# Patient Record
Sex: FEMALE | Race: WHITE | Employment: UNEMPLOYED | URBAN - METROPOLITAN AREA
[De-identification: names, ages, dates, MRNs, and addresses within clinical notes are randomized per-mention and may not be internally consistent; named-entity substitution may affect disease eponyms.]

---

## 2023-08-03 ENCOUNTER — HOSPITAL ENCOUNTER (OUTPATIENT)
Dept: PHYSICAL THERAPY | Age: 23
Setting detail: THERAPIES SERIES
Discharge: HOME | End: 2023-08-03
Payer: COMMERCIAL

## 2023-08-03 PROCEDURE — 97162 PT EVAL MOD COMPLEX 30 MIN: CPT

## 2023-08-03 PROCEDURE — 97530 THERAPEUTIC ACTIVITIES: CPT

## 2023-08-03 NOTE — PROGRESS NOTES
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  Treatment Diagnosis:underactive pelvic floor, pelvic floor weakness                          Insurance/Certification information:  PT Insurance Information: [employer]  Physician Information:  Kristel Leach DO  Plan of care signed (Y/N):  N    Visit# / total visits:         Functional Outcome (if applicable):          TBD NV    Medicare Cap (if applicable):  N/a= total amount used, updated 8/3/2023    Time in:   11:15am      Timed Treatment: 45min Total Treatment Time:  60min  ________________________________________________________________________________________    Pain Level:    /10  SUBJECTIVE:  see eval    OBJECTIVE:     Exercise (TE) Resistance/Repetitions Other comments            PF strengthening                                  PF relaxation Belly breathing:x10         Hip ADD stretch:x10         Happy Baby Pose:x10           Cat to Cow: x10              Other Treatment:   TA:  Bladder re-training/education:     Bladder Diary: patient educated on importance of filling out bladder diary at home, complete with fluid intake, voids, and leakage when applicable. Voiding: patient educated on normal voiding and urinary cycle and the physiology of bladder control muscles and pelvic floor. The patient was educated on bladder dysfunction as well as MERNA with urethral involvement.   The patient was also educated on decreased function of pelvic floor and it's affect on urination

## 2023-08-10 ENCOUNTER — HOSPITAL ENCOUNTER (OUTPATIENT)
Dept: PHYSICAL THERAPY | Age: 23
Setting detail: THERAPIES SERIES
Discharge: HOME | End: 2023-08-10
Payer: COMMERCIAL

## 2023-08-10 PROCEDURE — 97530 THERAPEUTIC ACTIVITIES: CPT

## 2023-08-10 PROCEDURE — 97110 THERAPEUTIC EXERCISES: CPT

## 2023-08-10 NOTE — PROGRESS NOTES
Disability index score of TBD or less for the PFDI-20 to assist with reaching prior level of function. [] Progressing: [] Met: [] Not Met: [] Adjusted  2. Patient will report ability to tolerate penetration without increase in pain to progress towards intercourse / examinations without pain or limitation. [] Progressing: [] Met: [] Not Met: [] Adjusted  3. Patient will increase PERF score to 4/5 with 10 second endurance without delay to relaxation to demonstrate increased strength and control over pelvic floor musculature. [] Progressing: [] Met: [] Not Met: [] Adjusted  4. Patient will report 1 week or greater without urinary incontinence to progress towards completing ADLs and recreational activities without leakage.   [] Progressing: [] Met: [] Not Met: [] Adjusted          Plan: [x] Continue per plan of care [] Alter current plan (see comments)   [] Plan of care initiated [] Hold pending MD visit [] Discharge      Plan for Next Session:  TE, TA, manual PRN    Re-Certification Due Date:     visit 12    Signature:  Padmini Jeffrey, PT, DPT

## 2023-08-15 ENCOUNTER — HOSPITAL ENCOUNTER (OUTPATIENT)
Dept: PHYSICAL THERAPY | Age: 23
Setting detail: THERAPIES SERIES
End: 2023-08-15
Payer: COMMERCIAL

## 2023-08-17 ENCOUNTER — HOSPITAL ENCOUNTER (OUTPATIENT)
Dept: PHYSICAL THERAPY | Age: 23
Setting detail: THERAPIES SERIES
Discharge: HOME | End: 2023-08-17
Payer: COMMERCIAL

## 2023-08-17 PROCEDURE — 97140 MANUAL THERAPY 1/> REGIONS: CPT

## 2023-08-17 PROCEDURE — 97110 THERAPEUTIC EXERCISES: CPT

## 2023-08-17 NOTE — PROGRESS NOTES
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  Treatment Diagnosis:underactive pelvic floor, pelvic floor weakness                          Insurance/Certification information:  PT Insurance Information: [employer]  Physician Information:  Keenan Bar DO  Plan of care signed (Y/N):  Y    Visit# / total visits:  3/12 Exp 10/20/23      Functional Outcome (if applicable): PFDI-20: 107/300    Medicare Cap (if applicable):  N/a= total amount used, updated 2023    Time in:   10:30am      Timed Treatment: 45min Total Treatment Time:  45min  ________________________________________________________________________________________    Pain Level:    0/10  SUBJECTIVE:  Patient reports \"I would say things are going about the same, I am doing the kegels and I have used to dilators 2 times since last visit\". Reports she did get a call back from OBGYN office regarding estrogen, plans to have an in office visit prior to prescribing. Is having less straining with BMs since increasing water and fiber.       OBJECTIVE:     Exercise (TE) Resistance/Repetitions Other comments            PF strengthening  Concentrating on relaxation      Short hold: (1sec) 10 times       Long hold: (4sec) 10 times       PF +Hip ABD x10 W/ blue   PF + HIP ADD x10 W/ ball squeeze     PF relaxation Belly breathing:x10         Hip ADD stretch:x10         Happy Baby Pose:x10           Cat to Cow: x10            HEP:  Access Code: [email or web address]  URL:

## 2023-08-24 ENCOUNTER — HOSPITAL ENCOUNTER (OUTPATIENT)
Dept: PHYSICAL THERAPY | Age: 23
Setting detail: THERAPIES SERIES
Discharge: HOME | End: 2023-08-24
Payer: COMMERCIAL

## 2023-08-24 PROCEDURE — 97530 THERAPEUTIC ACTIVITIES: CPT

## 2023-08-24 PROCEDURE — 97110 THERAPEUTIC EXERCISES: CPT

## 2023-08-24 NOTE — PROGRESS NOTES
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  Treatment Diagnosis:underactive pelvic floor, pelvic floor weakness                          Insurance/Certification information:  PT Insurance Information: [employer]  Physician Information:  Lauryn Ramsey DO  Plan of care signed (Y/N):  Y    Visit# / total visits:   Exp 10/20/23      Functional Outcome (if applicable): PFDI-20: 107/300    Medicare Cap (if applicable):  N/a= total amount used, updated 2023    Time in:   10:30am      Timed Treatment: 45min Total Treatment Time:  45min  ________________________________________________________________________________________    Pain Level:    0/10  SUBJECTIVE:  Patient reports \"I just found out I am pregnant, things are going good\". \"I have been doing the stretching and everything\". Reports compliance with HEP. OBJECTIVE:     Exercise (TE) Resistance/Repetitions Other comments            PF strengthening  Concentrating on relaxation      Short hold: (1sec) 10 times       Long hold: (4sec) 10 times       PF +Hip ABD x10 W/ blue     PF + HIP ADD x10 W/ ball squeeze   Bridge x10    Clamshell x10      PF relaxation Belly breathing:x10         Hip ADD stretch:x10         Happy Baby Pose:x10           Cat to Cow: x10            HEP:  Access Code: [email or web address]  URL: [email or web address]  Date: 2023  Prepared by: Nolvia Passe    Exercises  - Seated Pelvic Floor Contraction  - 4 x daily - 7 x weekly - 1

## 2023-08-28 ENCOUNTER — APPOINTMENT (OUTPATIENT)
Dept: PHYSICAL THERAPY | Age: 23
End: 2023-08-28
Payer: COMMERCIAL

## 2023-08-29 ENCOUNTER — OFFICE VISIT (OUTPATIENT)
Dept: OBGYN CLINIC | Age: 23
End: 2023-08-29
Payer: COMMERCIAL

## 2023-08-29 VITALS
HEIGHT: 63 IN | BODY MASS INDEX: 26.58 KG/M2 | TEMPERATURE: 99 F | HEART RATE: 82 BPM | DIASTOLIC BLOOD PRESSURE: 74 MMHG | WEIGHT: 150 LBS | SYSTOLIC BLOOD PRESSURE: 114 MMHG

## 2023-08-29 DIAGNOSIS — Z32.01 POSITIVE URINE PREGNANCY TEST: ICD-10-CM

## 2023-08-29 DIAGNOSIS — Z20.2 POSSIBLE EXPOSURE TO STD: ICD-10-CM

## 2023-08-29 DIAGNOSIS — O36.80X0 PREGNANCY OF UNKNOWN ANATOMIC LOCATION: Primary | ICD-10-CM

## 2023-08-29 LAB
CONTROL: NORMAL
PREGNANCY TEST URINE, POC: POSITIVE

## 2023-08-29 PROCEDURE — G8419 CALC BMI OUT NRM PARAM NOF/U: HCPCS | Performed by: OBSTETRICS & GYNECOLOGY

## 2023-08-29 PROCEDURE — 81025 URINE PREGNANCY TEST: CPT | Performed by: OBSTETRICS & GYNECOLOGY

## 2023-08-29 PROCEDURE — G8427 DOCREV CUR MEDS BY ELIG CLIN: HCPCS | Performed by: OBSTETRICS & GYNECOLOGY

## 2023-08-29 PROCEDURE — 81001 URINALYSIS AUTO W/SCOPE: CPT | Performed by: OBSTETRICS & GYNECOLOGY

## 2023-08-29 PROCEDURE — 99213 OFFICE O/P EST LOW 20 MIN: CPT | Performed by: OBSTETRICS & GYNECOLOGY

## 2023-08-29 PROCEDURE — 1036F TOBACCO NON-USER: CPT | Performed by: OBSTETRICS & GYNECOLOGY

## 2023-08-29 PROCEDURE — 36415 COLL VENOUS BLD VENIPUNCTURE: CPT | Performed by: OBSTETRICS & GYNECOLOGY

## 2023-08-29 SDOH — ECONOMIC STABILITY: FOOD INSECURITY: WITHIN THE PAST 12 MONTHS, YOU WORRIED THAT YOUR FOOD WOULD RUN OUT BEFORE YOU GOT MONEY TO BUY MORE.: NEVER TRUE

## 2023-08-29 SDOH — ECONOMIC STABILITY: TRANSPORTATION INSECURITY
IN THE PAST 12 MONTHS, HAS LACK OF TRANSPORTATION KEPT YOU FROM MEETINGS, WORK, OR FROM GETTING THINGS NEEDED FOR DAILY LIVING?: NO

## 2023-08-29 SDOH — ECONOMIC STABILITY: HOUSING INSECURITY
IN THE LAST 12 MONTHS, WAS THERE A TIME WHEN YOU DID NOT HAVE A STEADY PLACE TO SLEEP OR SLEPT IN A SHELTER (INCLUDING NOW)?: NO

## 2023-08-29 SDOH — ECONOMIC STABILITY: FOOD INSECURITY: WITHIN THE PAST 12 MONTHS, THE FOOD YOU BOUGHT JUST DIDN'T LAST AND YOU DIDN'T HAVE MONEY TO GET MORE.: NEVER TRUE

## 2023-08-29 SDOH — ECONOMIC STABILITY: INCOME INSECURITY: HOW HARD IS IT FOR YOU TO PAY FOR THE VERY BASICS LIKE FOOD, HOUSING, MEDICAL CARE, AND HEATING?: NOT HARD AT ALL

## 2023-08-29 ASSESSMENT — PATIENT HEALTH QUESTIONNAIRE - PHQ9
SUM OF ALL RESPONSES TO PHQ9 QUESTIONS 1 & 2: 0
SUM OF ALL RESPONSES TO PHQ QUESTIONS 1-9: 0
1. LITTLE INTEREST OR PLEASURE IN DOING THINGS: 0
SUM OF ALL RESPONSES TO PHQ QUESTIONS 1-9: 0
2. FEELING DOWN, DEPRESSED OR HOPELESS: NOT AT ALL
SUM OF ALL RESPONSES TO PHQ QUESTIONS 1-9: 0
SUM OF ALL RESPONSES TO PHQ QUESTIONS 1-9: 0
SUM OF ALL RESPONSES TO PHQ9 QUESTIONS 1 & 2: 0
2. FEELING DOWN, DEPRESSED OR HOPELESS: 0
1. LITTLE INTEREST OR PLEASURE IN DOING THINGS: NOT AT ALL

## 2023-08-30 LAB
B-HCG SERPL EIA 3RD IS-ACNC: 4925 MIU/ML
BACTERIA URNS QL MICRO: NORMAL /HPF
BILIRUB UR QL STRIP.AUTO: NEGATIVE
CLARITY UR: CLEAR
COLOR UR: YELLOW
EPI CELLS #/AREA URNS AUTO: 1 /HPF (ref 0–5)
GLUCOSE UR STRIP.AUTO-MCNC: NEGATIVE MG/DL
HGB UR QL STRIP.AUTO: NEGATIVE
HYALINE CASTS #/AREA URNS AUTO: 0 /LPF (ref 0–8)
KETONES UR STRIP.AUTO-MCNC: NEGATIVE MG/DL
LEUKOCYTE ESTERASE UR QL STRIP.AUTO: NEGATIVE
NITRITE UR QL STRIP.AUTO: NEGATIVE
PH UR STRIP.AUTO: 6.5 [PH] (ref 5–8)
PROT UR STRIP.AUTO-MCNC: NEGATIVE MG/DL
RBC CLUMPS #/AREA URNS AUTO: 0 /HPF (ref 0–4)
SP GR UR STRIP.AUTO: 1.01 (ref 1–1.03)
UA DIPSTICK W REFLEX MICRO PNL UR: NORMAL
URN SPEC COLLECT METH UR: NORMAL
UROBILINOGEN UR STRIP-ACNC: 0.2 E.U./DL
WBC #/AREA URNS AUTO: 0 /HPF (ref 0–5)

## 2023-08-31 ENCOUNTER — NURSE ONLY (OUTPATIENT)
Dept: OBGYN CLINIC | Age: 23
End: 2023-08-31
Payer: COMMERCIAL

## 2023-08-31 VITALS — TEMPERATURE: 97.3 F | DIASTOLIC BLOOD PRESSURE: 64 MMHG | SYSTOLIC BLOOD PRESSURE: 100 MMHG | HEART RATE: 91 BPM

## 2023-08-31 DIAGNOSIS — O36.80X0 PREGNANCY OF UNKNOWN ANATOMIC LOCATION: ICD-10-CM

## 2023-08-31 LAB
BACTERIA UR CULT: NORMAL
C TRACH DNA CVX QL NAA+PROBE: NEGATIVE
N GONORRHOEA DNA CERV MUCUS QL NAA+PROBE: NEGATIVE

## 2023-08-31 PROCEDURE — 36415 COLL VENOUS BLD VENIPUNCTURE: CPT | Performed by: OBSTETRICS & GYNECOLOGY

## 2023-08-31 NOTE — PROGRESS NOTES
Blood draw from R [facility]phalic x 1 attempt without difficulty. 1 PST tubes drawn. Patient tolerated well.  Corey Tadeo MA

## 2023-09-01 LAB — B-HCG SERPL EIA 3RD IS-ACNC: 7699 MIU/ML

## 2023-09-05 ENCOUNTER — HOSPITAL ENCOUNTER (OUTPATIENT)
Dept: PHYSICAL THERAPY | Age: 23
Setting detail: THERAPIES SERIES
Discharge: HOME | End: 2023-09-05

## 2023-09-05 NOTE — PROGRESS NOTES
700 [facility] and Therapy[address], [address] [place] [address]  Phone: [phone]  Fax [phone]      Physical Therapy  Cancellation/No-show Note  Patient Name:  Aleah Rebollar  :  2000   Date:  2023  Cancels to date: 1  No-shows to date: 0    For today's appointment patient:  [x] Cancelled  [] Rescheduled appointment  [] No-show     Reason given by patient:  [] Patient ill  [] Conflicting appointment  [] No transportation    [] Conflict with work  [x] No reason given  [] Other:     Comments:      Electronically signed by:  Shani Koenig PT

## 2023-09-06 ENCOUNTER — PATIENT MESSAGE (OUTPATIENT)
Dept: OBGYN CLINIC | Age: 23
End: 2023-09-06

## 2023-09-08 NOTE — TELEPHONE ENCOUNTER
Spoke with patient aware phenergan 12.5mg take one pill every 6 hours prn nausea and vomiting #30  0rf will be called to pharmacy. Michelle  has an option for rectal suppositories if needed. She will let us know if she is unable to keep the pill down. Phoned script to [facility]. Done.

## 2023-09-13 ENCOUNTER — HOSPITAL ENCOUNTER (OUTPATIENT)
Dept: PHYSICAL THERAPY | Age: 23
Setting detail: THERAPIES SERIES
Discharge: HOME | End: 2023-09-13

## 2023-09-13 NOTE — PROGRESS NOTES
700 [facility] and [facility],  [address], [address]  Phone: [phone]  Fax [phone]      Physical Therapy  Cancellation/No-show Note  Patient Name:  Osbaldo Lipscomb  :  2000   Date:  2023  Cancels to date: 1  No-shows to date: 1    For today's appointment patient:  [] Cancelled  [] Rescheduled appointment  [x] No-show     Reason given by patient:  [] Patient ill  [] Conflicting appointment  [] No transportation    [] Conflict with work  [] No reason given  [] Other:     Comments:      Electronically signed by:  Virgil Duverney, PT

## 2023-09-14 ENCOUNTER — INITIAL PRENATAL (OUTPATIENT)
Dept: OBGYN CLINIC | Age: 23
End: 2023-09-14

## 2023-09-14 VITALS
HEART RATE: 104 BPM | WEIGHT: 149.8 LBS | SYSTOLIC BLOOD PRESSURE: 122 MMHG | BODY MASS INDEX: 26.54 KG/M2 | TEMPERATURE: 98.4 F | DIASTOLIC BLOOD PRESSURE: 70 MMHG

## 2023-09-14 DIAGNOSIS — Z82.79 FAMILY HISTORY OF TRISOMY 18: ICD-10-CM

## 2023-09-14 DIAGNOSIS — O21.9 NAUSEA AND VOMITING DURING PREGNANCY: ICD-10-CM

## 2023-09-14 DIAGNOSIS — Z82.79 FAMILY HISTORY OF AUTOSOMAL ANEUPLOIDY: ICD-10-CM

## 2023-09-14 DIAGNOSIS — Z87.59 HISTORY OF GESTATIONAL HYPERTENSION: ICD-10-CM

## 2023-09-14 DIAGNOSIS — Z34.81 PRENATAL CARE, SUBSEQUENT PREGNANCY IN FIRST TRIMESTER: Primary | ICD-10-CM

## 2023-09-14 RX ORDER — PROMETHAZINE HYDROCHLORIDE 12.5 MG/1
TABLET ORAL
COMMUNITY
Start: 2023-09-08

## 2023-09-14 RX ORDER — DIPHENHYDRAMINE HYDROCHLORIDE 25 MG/1
25 CAPSULE ORAL 3 TIMES DAILY
Qty: 90 TABLET | Refills: 3 | Status: SHIPPED | OUTPATIENT
Start: 2023-09-14

## 2023-09-14 RX ORDER — ONDANSETRON 4 MG/1
4 TABLET, ORALLY DISINTEGRATING ORAL 3 TIMES DAILY PRN
Qty: 30 TABLET | Refills: 3 | Status: SHIPPED | OUTPATIENT
Start: 2023-09-14

## 2023-09-14 RX ORDER — ONDANSETRON 4 MG/1
4 TABLET, ORALLY DISINTEGRATING ORAL EVERY 8 HOURS PRN
Qty: 20 TABLET | Refills: 1 | Status: SHIPPED | OUTPATIENT
Start: 2023-09-14

## 2023-09-14 NOTE — PROGRESS NOTES
is no guarding or rebound. Musculoskeletal:         General: No swelling. Skin:     General: Skin is warm and dry. Neurological:      Mental Status: She is alert and oriented to person, place, and time. Psychiatric:         Mood and Affect: Mood normal.         Behavior: Behavior normal.         Thought Content: Thought content normal.          Ultrasound  OBSTETRIC ULTRASOUND--1ST TRIMESTER     DATE: 23     PHYSICIAN: CIARRA Flores D.O.      SONOGRAPHER: ALDO Beltran MS     INDICATION: Amenorrhea     TYPE OF SCAN:  vaginal     FINDINGS:       The cul de sac is normal.  The cervix is normal.  The uterus is gravid. The uterus measures 9.27 cm x 7.19 cm x 6.34 cm. No uterine anomalies are evident. The right ovary is present. The right ovary measures 2.86 cm x 2.71 cm x 2.17 cm. The left ovary is present. The left ovary measures 2.47 cm x 1.26 cm x 1.12 cm. There is a single intrauterine pregnancy identified. A fetal pole is noted with a CRL measuring 0.90 cm, consistent with gestational age of 6weeks and 6days and EDC of 5/3/24. There is a 4 day discrepancy when compared with the gestational age of 7weeks and 3days and EDC of 24 set by FDLMP (23). Yolk sac is present and measures 0.50 cm. Fetal cardiac activity is present at 142 bpm.      IMPRESSION:    Single IUP with cardiac activity. Imaging is limited secondary to bowel gas. Patient is well aware of the limitations of ultrasound in the detection of anomalies. Assessment/Plan:   Nirmala Reina is a 26 y.o.  at 7w3d who presents for initial prenatal visit. 1. Prenatal care, subsequent pregnancy in first trimester     - Doing well today     - Fetal wellbeing reassuring by US today     - Maternal wellness questionnaire reviewed - no concerns today, score 5     - Continue PNV     - IPV labs ordered - desires to have outpatient  - Syphilis Antibody Cascading Reflex;  Future  - Varicella Zoster Antibody,

## 2023-09-15 LAB
AMPHETAMINES UR QL SCN>1000 NG/ML: NORMAL
BARBITURATES UR QL SCN>200 NG/ML: NORMAL
BENZODIAZ UR QL SCN>200 NG/ML: NORMAL
BUPRENORPHINE+NOR UR QL SCN: NORMAL
CANNABINOIDS UR QL SCN>50 NG/ML: NORMAL
COCAINE UR QL SCN: NORMAL
DRUG SCREEN COMMENT UR-IMP: NORMAL
FENTANYL SCREEN, URINE: NORMAL
METHADONE UR QL SCN>300 NG/ML: NORMAL
OPIATES UR QL SCN>300 NG/ML: NORMAL
OXYCODONE UR QL SCN: NORMAL
PCP UR QL SCN>25 NG/ML: NORMAL
PH UR STRIP: 7 [PH]

## 2023-09-20 ENCOUNTER — HOSPITAL ENCOUNTER (OUTPATIENT)
Dept: PHYSICAL THERAPY | Age: 23
Setting detail: THERAPIES SERIES
Discharge: HOME | End: 2023-09-20

## 2023-09-20 NOTE — PROGRESS NOTES
700 [facility] and Therapy[address], [address] [facility]  Phone: [phone]  Fax [phone]      Physical Therapy  Cancellation/No-show Note  Patient Name:  Erum Galarza  :  2000   Date:  2023  Cancels to date: 2  No-shows to date: 1    For today's appointment patient:  [x] Cancelled  [] Rescheduled appointment  [] No-show     Reason given by patient:  [x] Patient ill  [] Conflicting appointment  [] No transportation    [] Conflict with work  [] No reason given  [] Other:     Comments:      Electronically signed by:  Benjamin Valdez PT

## 2023-09-23 PROBLEM — Z82.79 FAMILY HISTORY OF TRISOMY 18: Status: ACTIVE | Noted: 2023-09-23

## 2023-09-23 PROBLEM — Z87.59 HISTORY OF GESTATIONAL HYPERTENSION: Status: ACTIVE | Noted: 2023-09-23

## 2023-09-23 PROBLEM — O21.9 NAUSEA AND VOMITING DURING PREGNANCY: Status: ACTIVE | Noted: 2023-09-23

## 2023-09-23 PROBLEM — Z34.81 PRENATAL CARE, SUBSEQUENT PREGNANCY IN FIRST TRIMESTER: Status: ACTIVE | Noted: 2023-09-23

## 2023-09-23 PROBLEM — Z82.79 FAMILY HISTORY OF AUTOSOMAL ANEUPLOIDY: Status: ACTIVE | Noted: 2023-09-23
